# Patient Record
Sex: MALE | Race: BLACK OR AFRICAN AMERICAN | NOT HISPANIC OR LATINO | URBAN - METROPOLITAN AREA
[De-identification: names, ages, dates, MRNs, and addresses within clinical notes are randomized per-mention and may not be internally consistent; named-entity substitution may affect disease eponyms.]

---

## 2018-07-05 ENCOUNTER — INPATIENT (INPATIENT)
Facility: HOSPITAL | Age: 48
LOS: 0 days | Discharge: LEFT BEFORE DISCHARGE | DRG: 683 | End: 2018-07-06
Attending: HOSPITALIST | Admitting: HOSPITALIST
Payer: MEDICAID

## 2018-07-05 VITALS
RESPIRATION RATE: 17 BRPM | TEMPERATURE: 99 F | HEART RATE: 110 BPM | OXYGEN SATURATION: 100 % | DIASTOLIC BLOOD PRESSURE: 70 MMHG | SYSTOLIC BLOOD PRESSURE: 116 MMHG | WEIGHT: 240.08 LBS

## 2018-07-05 DIAGNOSIS — R79.89 OTHER SPECIFIED ABNORMAL FINDINGS OF BLOOD CHEMISTRY: ICD-10-CM

## 2018-07-05 DIAGNOSIS — K21.9 GASTRO-ESOPHAGEAL REFLUX DISEASE WITHOUT ESOPHAGITIS: ICD-10-CM

## 2018-07-05 DIAGNOSIS — Z91.89 OTHER SPECIFIED PERSONAL RISK FACTORS, NOT ELSEWHERE CLASSIFIED: ICD-10-CM

## 2018-07-05 DIAGNOSIS — R63.8 OTHER SYMPTOMS AND SIGNS CONCERNING FOOD AND FLUID INTAKE: ICD-10-CM

## 2018-07-05 DIAGNOSIS — B20 HUMAN IMMUNODEFICIENCY VIRUS [HIV] DISEASE: ICD-10-CM

## 2018-07-05 DIAGNOSIS — Z86.2 PERSONAL HISTORY OF DISEASES OF THE BLOOD AND BLOOD-FORMING ORGANS AND CERTAIN DISORDERS INVOLVING THE IMMUNE MECHANISM: Chronic | ICD-10-CM

## 2018-07-05 DIAGNOSIS — Z29.9 ENCOUNTER FOR PROPHYLACTIC MEASURES, UNSPECIFIED: ICD-10-CM

## 2018-07-05 DIAGNOSIS — Z90.81 ACQUIRED ABSENCE OF SPLEEN: Chronic | ICD-10-CM

## 2018-07-05 DIAGNOSIS — N17.9 ACUTE KIDNEY FAILURE, UNSPECIFIED: ICD-10-CM

## 2018-07-05 DIAGNOSIS — E87.1 HYPO-OSMOLALITY AND HYPONATREMIA: ICD-10-CM

## 2018-07-05 DIAGNOSIS — R50.9 FEVER, UNSPECIFIED: ICD-10-CM

## 2018-07-05 LAB
ALBUMIN SERPL ELPH-MCNC: 3.4 G/DL — SIGNIFICANT CHANGE UP (ref 3.3–5)
ALBUMIN SERPL ELPH-MCNC: 3.5 G/DL — SIGNIFICANT CHANGE UP (ref 3.4–5)
ALP SERPL-CCNC: 129 U/L — HIGH (ref 40–120)
ALP SERPL-CCNC: 92 U/L — SIGNIFICANT CHANGE UP (ref 40–120)
ALT FLD-CCNC: 126 U/L — HIGH (ref 12–42)
ALT FLD-CCNC: 74 U/L — HIGH (ref 10–45)
ANION GAP SERPL CALC-SCNC: 11 MMOL/L — SIGNIFICANT CHANGE UP (ref 5–17)
ANION GAP SERPL CALC-SCNC: 7 MMOL/L — LOW (ref 9–16)
APPEARANCE CSF: CLEAR — SIGNIFICANT CHANGE UP
APPEARANCE SPUN FLD: COLORLESS — SIGNIFICANT CHANGE UP
APPEARANCE UR: CLEAR — SIGNIFICANT CHANGE UP
APTT BLD: 30.5 SEC — SIGNIFICANT CHANGE UP (ref 27.5–36.5)
APTT BLD: 31.1 SEC — SIGNIFICANT CHANGE UP (ref 27.5–37.4)
AST SERPL-CCNC: 111 U/L — HIGH (ref 15–37)
AST SERPL-CCNC: 63 U/L — HIGH (ref 10–40)
BASOPHILS NFR BLD AUTO: 0.6 % — SIGNIFICANT CHANGE UP (ref 0–2)
BILIRUB SERPL-MCNC: 0.9 MG/DL — SIGNIFICANT CHANGE UP (ref 0.2–1.2)
BILIRUB SERPL-MCNC: 1 MG/DL — SIGNIFICANT CHANGE UP (ref 0.2–1.2)
BILIRUB UR-MCNC: ABNORMAL
BUN SERPL-MCNC: 15 MG/DL — SIGNIFICANT CHANGE UP (ref 7–23)
BUN SERPL-MCNC: 17 MG/DL — SIGNIFICANT CHANGE UP (ref 7–23)
CALCIUM SERPL-MCNC: 7.9 MG/DL — LOW (ref 8.4–10.5)
CALCIUM SERPL-MCNC: 8.5 MG/DL — SIGNIFICANT CHANGE UP (ref 8.5–10.5)
CHLORIDE SERPL-SCNC: 96 MMOL/L — SIGNIFICANT CHANGE UP (ref 96–108)
CHLORIDE SERPL-SCNC: 97 MMOL/L — SIGNIFICANT CHANGE UP (ref 96–108)
CHOLEST SERPL-MCNC: 148 MG/DL — SIGNIFICANT CHANGE UP (ref 10–199)
CO2 SERPL-SCNC: 23 MMOL/L — SIGNIFICANT CHANGE UP (ref 22–31)
CO2 SERPL-SCNC: 28 MMOL/L — SIGNIFICANT CHANGE UP (ref 22–31)
COLOR CSF: SIGNIFICANT CHANGE UP
COLOR SPEC: YELLOW — SIGNIFICANT CHANGE UP
CREAT SERPL-MCNC: 1.25 MG/DL — SIGNIFICANT CHANGE UP (ref 0.5–1.3)
CREAT SERPL-MCNC: 1.72 MG/DL — HIGH (ref 0.5–1.3)
CRP SERPL-MCNC: 13.14 MG/DL — HIGH (ref 0–0.4)
DIFF PNL FLD: NEGATIVE — SIGNIFICANT CHANGE UP
EOSINOPHIL NFR BLD AUTO: 0 % — SIGNIFICANT CHANGE UP (ref 0–6)
FLUAV SPEC QL CULT: NEGATIVE — SIGNIFICANT CHANGE UP
FLUBV AG SPEC QL IA: NEGATIVE — SIGNIFICANT CHANGE UP
GLUCOSE CSF-MCNC: 79 MG/DL — HIGH (ref 40–70)
GLUCOSE SERPL-MCNC: 124 MG/DL — HIGH (ref 70–99)
GLUCOSE SERPL-MCNC: 169 MG/DL — HIGH (ref 70–99)
GLUCOSE UR QL: NEGATIVE — SIGNIFICANT CHANGE UP
HAV IGM SER-ACNC: SIGNIFICANT CHANGE UP
HBA1C BLD-MCNC: 5.3 % — SIGNIFICANT CHANGE UP (ref 4–5.6)
HBV CORE IGM SER-ACNC: SIGNIFICANT CHANGE UP
HBV SURFACE AG SER-ACNC: SIGNIFICANT CHANGE UP
HCT VFR BLD CALC: 39.2 % — SIGNIFICANT CHANGE UP (ref 39–50)
HCT VFR BLD CALC: 45.6 % — SIGNIFICANT CHANGE UP (ref 39–50)
HCV AB S/CO SERPL IA: 0.14 S/CO — SIGNIFICANT CHANGE UP
HCV AB SERPL-IMP: SIGNIFICANT CHANGE UP
HDLC SERPL-MCNC: 28 MG/DL — LOW (ref 40–125)
HGB BLD-MCNC: 13.4 G/DL — SIGNIFICANT CHANGE UP (ref 13–17)
HGB BLD-MCNC: 15.7 G/DL — SIGNIFICANT CHANGE UP (ref 13–17)
IMM GRANULOCYTES NFR BLD AUTO: 0.3 % — SIGNIFICANT CHANGE UP (ref 0–1.5)
INR BLD: 1.08 — SIGNIFICANT CHANGE UP (ref 0.88–1.16)
INR BLD: 1.11 — SIGNIFICANT CHANGE UP (ref 0.88–1.16)
KETONES UR-MCNC: NEGATIVE — SIGNIFICANT CHANGE UP
LACTATE SERPL-SCNC: 1.2 MMOL/L — SIGNIFICANT CHANGE UP (ref 0.4–2)
LACTATE SERPL-SCNC: 2.1 MMOL/L — HIGH (ref 0.4–2)
LEUKOCYTE ESTERASE UR-ACNC: NEGATIVE — SIGNIFICANT CHANGE UP
LIPID PNL WITH DIRECT LDL SERPL: 89 MG/DL — SIGNIFICANT CHANGE UP
LYMPHOCYTES # BLD AUTO: 37.7 % — SIGNIFICANT CHANGE UP (ref 13–44)
MCHC RBC-ENTMCNC: 30.3 PG — SIGNIFICANT CHANGE UP (ref 27–34)
MCHC RBC-ENTMCNC: 31 PG — SIGNIFICANT CHANGE UP (ref 27–34)
MCHC RBC-ENTMCNC: 34.2 G/DL — SIGNIFICANT CHANGE UP (ref 32–36)
MCHC RBC-ENTMCNC: 34.4 G/DL — SIGNIFICANT CHANGE UP (ref 32–36)
MCV RBC AUTO: 88.7 FL — SIGNIFICANT CHANGE UP (ref 80–100)
MCV RBC AUTO: 89.9 FL — SIGNIFICANT CHANGE UP (ref 80–100)
MONOCYTES NFR BLD AUTO: 3.5 % — SIGNIFICANT CHANGE UP (ref 2–14)
NEUTROPHILS # CSF: 0 % — SIGNIFICANT CHANGE UP (ref 0–6)
NEUTROPHILS NFR BLD AUTO: 57.9 % — SIGNIFICANT CHANGE UP (ref 43–77)
NITRITE UR-MCNC: NEGATIVE — SIGNIFICANT CHANGE UP
NRBC NFR CSF: 1 /UL — SIGNIFICANT CHANGE UP
PH UR: 6 — SIGNIFICANT CHANGE UP (ref 5–8)
PLATELET # BLD AUTO: 213 K/UL — SIGNIFICANT CHANGE UP (ref 150–400)
PLATELET # BLD AUTO: 266 K/UL — SIGNIFICANT CHANGE UP (ref 150–400)
POTASSIUM SERPL-MCNC: 3 MMOL/L — LOW (ref 3.5–5.3)
POTASSIUM SERPL-MCNC: 3.6 MMOL/L — SIGNIFICANT CHANGE UP (ref 3.5–5.3)
POTASSIUM SERPL-SCNC: 3 MMOL/L — LOW (ref 3.5–5.3)
POTASSIUM SERPL-SCNC: 3.6 MMOL/L — SIGNIFICANT CHANGE UP (ref 3.5–5.3)
PROT CSF-MCNC: 48 MG/DL — HIGH (ref 15–45)
PROT SERPL-MCNC: 5.8 G/DL — LOW (ref 6–8.3)
PROT SERPL-MCNC: 8 G/DL — SIGNIFICANT CHANGE UP (ref 6.4–8.2)
PROT UR-MCNC: 100 MG/DL
PROTHROM AB SERPL-ACNC: 12 SEC — SIGNIFICANT CHANGE UP (ref 9.8–12.7)
PROTHROM AB SERPL-ACNC: 12.2 SEC — SIGNIFICANT CHANGE UP (ref 9.8–12.7)
RBC # BLD: 4.42 M/UL — SIGNIFICANT CHANGE UP (ref 4.2–5.8)
RBC # BLD: 5.07 M/UL — SIGNIFICANT CHANGE UP (ref 4.2–5.8)
RBC # CSF: 0 /UL — SIGNIFICANT CHANGE UP (ref 0–0)
RBC # FLD: 14.5 % — SIGNIFICANT CHANGE UP (ref 10.3–16.9)
RBC # FLD: 14.9 % — SIGNIFICANT CHANGE UP (ref 10.3–16.9)
SODIUM SERPL-SCNC: 131 MMOL/L — LOW (ref 132–145)
SODIUM SERPL-SCNC: 131 MMOL/L — LOW (ref 135–145)
SP GR SPEC: >=1.03 — SIGNIFICANT CHANGE UP (ref 1–1.03)
TOTAL CHOLESTEROL/HDL RATIO MEASUREMENT: 5.3 RATIO — SIGNIFICANT CHANGE UP (ref 3.4–9.6)
TRIGL SERPL-MCNC: 153 MG/DL — HIGH (ref 10–149)
TUBE TYPE: SIGNIFICANT CHANGE UP
UROBILINOGEN FLD QL: 1 E.U./DL — SIGNIFICANT CHANGE UP
WBC # BLD: 3.6 K/UL — LOW (ref 3.8–10.5)
WBC # BLD: 6.3 K/UL — SIGNIFICANT CHANGE UP (ref 3.8–10.5)
WBC # FLD AUTO: 3.6 K/UL — LOW (ref 3.8–10.5)
WBC # FLD AUTO: 6.3 K/UL — SIGNIFICANT CHANGE UP (ref 3.8–10.5)

## 2018-07-05 PROCEDURE — 62270 DX LMBR SPI PNXR: CPT

## 2018-07-05 PROCEDURE — 99223 1ST HOSP IP/OBS HIGH 75: CPT | Mod: GC

## 2018-07-05 PROCEDURE — 76705 ECHO EXAM OF ABDOMEN: CPT | Mod: 26

## 2018-07-05 PROCEDURE — 70450 CT HEAD/BRAIN W/O DYE: CPT | Mod: 26

## 2018-07-05 PROCEDURE — 71046 X-RAY EXAM CHEST 2 VIEWS: CPT | Mod: 26

## 2018-07-05 PROCEDURE — 99285 EMERGENCY DEPT VISIT HI MDM: CPT | Mod: 25

## 2018-07-05 RX ORDER — CEFTRIAXONE 500 MG/1
1 INJECTION, POWDER, FOR SOLUTION INTRAMUSCULAR; INTRAVENOUS ONCE
Qty: 0 | Refills: 0 | Status: COMPLETED | OUTPATIENT
Start: 2018-07-05 | End: 2018-07-05

## 2018-07-05 RX ORDER — POTASSIUM CHLORIDE 20 MEQ
40 PACKET (EA) ORAL ONCE
Qty: 0 | Refills: 0 | Status: COMPLETED | OUTPATIENT
Start: 2018-07-05 | End: 2018-07-05

## 2018-07-05 RX ORDER — ELVITEGRAVIR, COBICISTAT, EMTRICITABINE, AND TENOFOVIR ALAFENAMIDE 150; 150; 200; 10 MG/1; MG/1; MG/1; MG/1
1 TABLET ORAL
Qty: 0 | Refills: 0 | COMMUNITY

## 2018-07-05 RX ORDER — HEPARIN SODIUM 5000 [USP'U]/ML
5000 INJECTION INTRAVENOUS; SUBCUTANEOUS EVERY 8 HOURS
Qty: 0 | Refills: 0 | Status: DISCONTINUED | OUTPATIENT
Start: 2018-07-05 | End: 2018-07-06

## 2018-07-05 RX ORDER — POTASSIUM CHLORIDE 20 MEQ
40 PACKET (EA) ORAL ONCE
Qty: 0 | Refills: 0 | Status: DISCONTINUED | OUTPATIENT
Start: 2018-07-05 | End: 2018-07-06

## 2018-07-05 RX ORDER — SODIUM CHLORIDE 9 MG/ML
3 INJECTION INTRAMUSCULAR; INTRAVENOUS; SUBCUTANEOUS ONCE
Qty: 0 | Refills: 0 | Status: COMPLETED | OUTPATIENT
Start: 2018-07-05 | End: 2018-07-05

## 2018-07-05 RX ORDER — ACETAMINOPHEN 500 MG
975 TABLET ORAL ONCE
Qty: 0 | Refills: 0 | Status: COMPLETED | OUTPATIENT
Start: 2018-07-05 | End: 2018-07-05

## 2018-07-05 RX ORDER — VANCOMYCIN HCL 1 G
1000 VIAL (EA) INTRAVENOUS ONCE
Qty: 0 | Refills: 0 | Status: DISCONTINUED | OUTPATIENT
Start: 2018-07-05 | End: 2018-07-05

## 2018-07-05 RX ORDER — ELVITEGRAVIR, COBICISTAT, EMTRICITABINE, AND TENOFOVIR ALAFENAMIDE 150; 150; 200; 10 MG/1; MG/1; MG/1; MG/1
1 TABLET ORAL DAILY
Qty: 0 | Refills: 0 | Status: DISCONTINUED | OUTPATIENT
Start: 2018-07-05 | End: 2018-07-06

## 2018-07-05 RX ORDER — SODIUM CHLORIDE 9 MG/ML
500 INJECTION INTRAMUSCULAR; INTRAVENOUS; SUBCUTANEOUS
Qty: 0 | Refills: 0 | Status: COMPLETED | OUTPATIENT
Start: 2018-07-05 | End: 2018-07-05

## 2018-07-05 RX ORDER — VANCOMYCIN HCL 1 G
1500 VIAL (EA) INTRAVENOUS ONCE
Qty: 0 | Refills: 0 | Status: COMPLETED | OUTPATIENT
Start: 2018-07-05 | End: 2018-07-05

## 2018-07-05 RX ORDER — OMEPRAZOLE 10 MG/1
1 CAPSULE, DELAYED RELEASE ORAL
Qty: 0 | Refills: 0 | COMMUNITY

## 2018-07-05 RX ORDER — SODIUM CHLORIDE 9 MG/ML
1000 INJECTION INTRAMUSCULAR; INTRAVENOUS; SUBCUTANEOUS
Qty: 0 | Refills: 0 | Status: DISCONTINUED | OUTPATIENT
Start: 2018-07-05 | End: 2018-07-06

## 2018-07-05 RX ORDER — PANTOPRAZOLE SODIUM 20 MG/1
40 TABLET, DELAYED RELEASE ORAL
Qty: 0 | Refills: 0 | Status: DISCONTINUED | OUTPATIENT
Start: 2018-07-05 | End: 2018-07-06

## 2018-07-05 RX ORDER — ACETAMINOPHEN 500 MG
1000 TABLET ORAL ONCE
Qty: 0 | Refills: 0 | Status: COMPLETED | OUTPATIENT
Start: 2018-07-05 | End: 2018-07-05

## 2018-07-05 RX ADMIN — Medication 975 MILLIGRAM(S): at 12:29

## 2018-07-05 RX ADMIN — SODIUM CHLORIDE 2000 MILLILITER(S): 9 INJECTION INTRAMUSCULAR; INTRAVENOUS; SUBCUTANEOUS at 12:26

## 2018-07-05 RX ADMIN — SODIUM CHLORIDE 3 MILLILITER(S): 9 INJECTION INTRAMUSCULAR; INTRAVENOUS; SUBCUTANEOUS at 12:32

## 2018-07-05 RX ADMIN — SODIUM CHLORIDE 500 MILLILITER(S): 9 INJECTION INTRAMUSCULAR; INTRAVENOUS; SUBCUTANEOUS at 14:10

## 2018-07-05 RX ADMIN — SODIUM CHLORIDE 2000 MILLILITER(S): 9 INJECTION INTRAMUSCULAR; INTRAVENOUS; SUBCUTANEOUS at 12:33

## 2018-07-05 RX ADMIN — CEFTRIAXONE 100 GRAM(S): 500 INJECTION, POWDER, FOR SOLUTION INTRAMUSCULAR; INTRAVENOUS at 12:48

## 2018-07-05 RX ADMIN — SODIUM CHLORIDE 500 MILLILITER(S): 9 INJECTION INTRAMUSCULAR; INTRAVENOUS; SUBCUTANEOUS at 13:09

## 2018-07-05 RX ADMIN — SODIUM CHLORIDE 500 MILLILITER(S): 9 INJECTION INTRAMUSCULAR; INTRAVENOUS; SUBCUTANEOUS at 13:15

## 2018-07-05 RX ADMIN — SODIUM CHLORIDE 2000 MILLILITER(S): 9 INJECTION INTRAMUSCULAR; INTRAVENOUS; SUBCUTANEOUS at 13:14

## 2018-07-05 RX ADMIN — Medication 300 MILLIGRAM(S): at 17:08

## 2018-07-05 RX ADMIN — SODIUM CHLORIDE 150 MILLILITER(S): 9 INJECTION INTRAMUSCULAR; INTRAVENOUS; SUBCUTANEOUS at 23:11

## 2018-07-05 RX ADMIN — Medication 400 MILLIGRAM(S): at 21:02

## 2018-07-05 RX ADMIN — SODIUM CHLORIDE 2000 MILLILITER(S): 9 INJECTION INTRAMUSCULAR; INTRAVENOUS; SUBCUTANEOUS at 12:55

## 2018-07-05 RX ADMIN — SODIUM CHLORIDE 2000 MILLILITER(S): 9 INJECTION INTRAMUSCULAR; INTRAVENOUS; SUBCUTANEOUS at 12:34

## 2018-07-05 RX ADMIN — CEFTRIAXONE 1 GRAM(S): 500 INJECTION, POWDER, FOR SOLUTION INTRAMUSCULAR; INTRAVENOUS at 13:30

## 2018-07-05 RX ADMIN — SODIUM CHLORIDE 2000 MILLILITER(S): 9 INJECTION INTRAMUSCULAR; INTRAVENOUS; SUBCUTANEOUS at 12:32

## 2018-07-05 RX ADMIN — HEPARIN SODIUM 5000 UNIT(S): 5000 INJECTION INTRAVENOUS; SUBCUTANEOUS at 23:10

## 2018-07-05 NOTE — H&P ADULT - NSHPREVIEWOFSYSTEMS_GEN_ALL_CORE
REVIEW OF SYSTEMS:    CONSTITUTIONAL: As in HPI  EYES/ENT: Patient denies visual changes;  denies vertigo or throat pain   NECK: Patient denies pain or stiffness  RESPIRATORY: Patient denies cough, wheezing, hemoptysis; denies shortness of breath  CARDIOVASCULAR: Patient denies chest pain or palpitations  GASTROINTESTINAL: Patient denies abdominal or epigastric pain, nausea, vomiting, or hematemesis, diarrhea or constipation, melena or hematochezia.  GENITOURINARY: Patient denies dysuria, frequency or hematuria  NEUROLOGICAL: Patient denies numbness or weakness  SKIN: Patient denies itching, burning, rashes, or lesions   All other review of systems is negative unless indicated above.

## 2018-07-05 NOTE — H&P ADULT - PROBLEM SELECTOR PLAN 3
Patient with ROBBIN without elevated BUN, dark urine despite patient reporting high level of hydration during febrile illness. Unclear etiology as patient with dark urine but with elevated lft may have had hypotensive injury, so both pre-renal and intrinsic damage is possible  - follow up repeat CMP  - Follow up urine lytes  - follow up renal ulrasound  - Patient voiding well, no concern for obstruction on physical exam or RUQ U/s however will check post void bladder scan  - Consider nephrology consult in AM

## 2018-07-05 NOTE — H&P ADULT - PROBLEM SELECTOR PLAN 2
Patient reports HIV well controlled  - Collateral in AM  - HIV VL  - No need for CD4 count as well be decreased due to acute illness, collateral is more important. No History of OI. Patient does not know how contracted, reports safe sexual practices and no known contacts, no IVDU. 15 years ago, untreated for about 8 years, then started on medication. Does not remember tanika CD4 or peak VL, does not think he was ever given diagnosis of AIDs.

## 2018-07-05 NOTE — H&P ADULT - ATTENDING COMMENTS
Pt seen and examined at bedside on 7/5/2018 @ 2100    Agree with HPI, ROS as above.     VS, Labs, FH, SH, allergies, medications, imaging reviewed. I personally reviewed the CXR - no infiltrates. Agree with physical exam as above     A/P: Patient is a 47 yo M with PMHx of HIV (VLUD, CD4 unknown level but "Good", on Genvoya), ITP 15 years ago s/p splenectomy presents with two days of fevers, chills, HA and diaphoresis, admitted for workup of SIRS given negative LP, ROBBIN, and elevated LFTs    **SIRS  -Pt meeting 2/4 SIRS criteria on admission with high fevers, tachycardia with unclear etiology  -No clear infectious source; LP not c/w bacterial meningitis, RVP negative, U/A negative, CXR with no infiltrates, U/S abd - wnl  -F/u cultures  -Will explore other causes - check ferritin, hepatitis panel, esr/crp, possible rheumatologic?, Stills?    Plan otherwise as outlined above.....

## 2018-07-05 NOTE — ED ADULT NURSE REASSESSMENT NOTE - NS ED NURSE REASSESS COMMENT FT1
Pt lyign flat in stretcher in no acute distress. VSS, see flowsheet. Safety precautions maintained, will continue to monitor.

## 2018-07-05 NOTE — H&P ADULT - HISTORY OF PRESENT ILLNESS
Patient is a 49 yo M with PMHx of HIV (VLUD, CD4 unknown level but "Good", on Genvoya), ITP 15 years ago s/p splenectomy. Sent by PMD to urgent care then to ED for 2 days of fever, chills, and HA and decreased appetite. Patient was in usual state of health 3 days ago, was out working a job (, works outdoors) in the heat, went home and then started to have fevers overnight into the next day, with chills, diaphoresis, HA, and lack of appetite. Patient states he drank a lot of fluid during this time but had no appetite for food, denies N/V/D/C. HA worsened and did not remit with OTC Nsaids or tylenol, and so presented to PMD who sent him to urgent care who then sent him to [facility]. Patient last traveled 1 month ago to [place], has history of frequent travels but has no hx of travel related illness or rural/tropical exposures. Lives with one dog, eduardo, has all of his shots (friend is taking care). No history of AIDs, reports last hospitalization was for splenectomy, no antibiotics reported in many years.    ED Vitals: T 101.8, HR 96, /70, RR 16, SpO2 92%  ED administration: Ceftriaxone 1gm, Vancomycin 1500mg,  cc x6, tylenol 975 Patient is a 47 yo M with PMHx of HIV (VLUD, CD4 unknown level but "Good", on Genvoya), ITP 15 years ago s/p splenectomy. Sent by PMD to urgent care then to ED for 2 days of fever, chills, and HA and decreased appetite. Patient was in usual state of health 3 days ago, was out working a job (, works outdoors) in the heat, went home and then started to have fevers overnight into the next day, with chills, diaphoresis, HA, and lack of appetite. Patient states he drank a lot of fluid during this time but had no appetite for food, denies N/V/D/C. HA worsened and did not remit with OTC Nsaids or tylenol, and so presented to PMD who sent him to urgent care who then sent him to [facility]. Patient last traveled 1 month ago to [place], has history of frequent travels but has no hx of travel related illness or rural/tropical exposures. Lives with one dog, eduardo, has all of his shots (friend is taking care). No history of AIDs, reports last hospitalization was for splenectomy, no antibiotics reported in many years.    ED Vitals: T 101.8, HR 96, /70, RR 16, SpO2 92%  ED administration: Ceftriaxone 1gm, Vancomycin 1500mg,  cc x6, tylenol 975    FH: Denies cardiac disease in father

## 2018-07-05 NOTE — H&P ADULT - NSHPPHYSICALEXAM_GEN_ALL_CORE
.  VITAL SIGNS:  T(F): 99.5 (07-05-18 @ 18:35), Max: 101.8 (07-05-18 @ 12:25)  HR: 94 (07-05-18 @ 18:35) (90 - 110)  BP: 94/64 (07-05-18 @ 18:35) (94/64 - 135/70)  BP(mean): --  RR: 19 (07-05-18 @ 18:35) (16 - 19)  SpO2: 98% (07-05-18 @ 18:35) (92% - 100%)    PHYSICAL EXAM:    Constitutional: WDWN resting comfortably in bed; NAD  HEENT: NC/AT, PERRL, EOMI, anicteric sclera, no nasal discharge; uvula midline, no oropharyngeal erythema or exudates; MMM; no thrush; Red in the face due to sun exposure per patient, not painful, warm to the touch  Neck: supple; no JVD or thyromegaly  Respiratory: CTA B/L; no W/R/R, no retractions  Cardiac: +S1/S2; RRR; no M/R/G; PMI non-displaced  Gastrointestinal: soft, NT/ND; no rebound or guarding; +BSx4; Obese  Genitourinary: normal external genitalia  Back: spine midline, no bony tenderness or step-offs; no CVAT B/L  Extremities: WWP, no clubbing or cyanosis; no peripheral edema  Musculoskeletal: NROM x4; no joint swelling, tenderness or erythema  Vascular: 2+ radial, femoral, DP/PT pulses B/L  Dermatologic: skin warm, dry and intact; no rashes, wounds, or scars  Lymphatic: no submandibular or cervical LAD  Neurologic: AAOx3; CNII-XII grossly intact; no focal deficits  Psychiatric: affect and characteristics of appearance, verbalizations, behaviors are appropriate, denies SI/HI/AH/VH

## 2018-07-05 NOTE — H&P ADULT - PROBLEM SELECTOR PLAN 1
Patient with high grade fever, 103F, without clear source possibly causing end organ damage as detailed below.  - Judicious use of acetaminophen and nsaids for fever control given end organ damage as below  - Continue workup for source control  - Consider broad spectrum antibiotics when source has been identified Patient with high grade fever, 103F, without clear source possibly causing end organ damage as detailed below.  - Judicious use of acetaminophen and nsaids for fever control given end organ damage as below  - Continue workup for source control  - f/u ferritin for stills  - f/u ESR, CRP  - Consider broad spectrum antibiotics when source has been identified

## 2018-07-05 NOTE — H&P ADULT - ASSESSMENT
Patient is a 49 yo M with PMHx of HIV (VLUD, CD4 unknown level but "Good", on Genvoya), ITP 15 years ago s/p splenectomy presents with two days of fevers, chills, HA and diaphoresis, admitted for workup of SIRS given negative LP, ROBBIN, and elevated LFTs

## 2018-07-05 NOTE — H&P ADULT - PROBLEM SELECTOR PLAN 4
RUQ ultrasound normal, likely due to injury from fever as above  - Acute hepatitis panel  - Repeat LFTs, Coags

## 2018-07-05 NOTE — ED PROVIDER NOTE - OBJECTIVE STATEMENT
48y M HIV+ (on medication, undetectable VL, good CD4 count), daily EtOH ingestion, sent in by PMD for 2 days of fever, chills, and headache, as well as decreased appetite. Pt was in [place] 1 month ago. Denies cough, sore throat, abd pain, n/v, urinary symptoms.

## 2018-07-05 NOTE — H&P ADULT - NSHPLABSRESULTS_GEN_ALL_CORE
.  LABS:                         15.7   6.3   )-----------( 266      ( 05 Jul 2018 12:32 )             45.6     07-05    131<L>  |  96  |  17  ----------------------------<  124<H>  3.6   |  28  |  1.72<H>    Ca    8.5      05 Jul 2018 12:32    TPro  8.0  /  Alb  3.5  /  TBili  0.9  /  DBili  x   /  AST  111<H>  /  ALT  126<H>  /  AlkPhos  129<H>  07-05    PT/INR - ( 05 Jul 2018 12:32 )   PT: 12.2 sec;   INR: 1.11          PTT - ( 05 Jul 2018 12:32 )  PTT:30.5 sec  Urinalysis Basic - ( 05 Jul 2018 12:32 )    Color: Yellow / Appearance: Clear / SG: >=1.030 / pH: x  Gluc: x / Ketone: NEGATIVE  / Bili: Moderate / Urobili: 1.0 E.U./dL   Blood: x / Protein: 100 mg/dL / Nitrite: NEGATIVE   Leuk Esterase: NEGATIVE / RBC: x / WBC x   Sq Epi: x / Non Sq Epi: Rare /HPF / Bacteria: x            Lactate, Blood: 1.2 mmoL/L (07-05 @ 15:42)  Lactate, Blood: 2.1 mmoL/L (07-05 @ 12:32)      RADIOLOGY, EKG & ADDITIONAL TESTS:   < from: CT Head No Cont (07.05.18 @ 13:22) >    Small fatty and calcific lesion in the posterior suprasellar cistern is   most compatible with congenital lesion such as dermoid cyst and may be   incidental, as there is no evidence of cyst rupture. The study is   otherwise unremarkable.      < end of copied text >    Cerebrospinal Fluid Cell Count-1 (07.05.18 @ 14:57)    Total Nucleated Cell Count, CSF: 1 /uL    CSF Color: No Color    CSF Appearance: Clear    CSF Segmented Neutrophils: 0 %  Glucose, CSF (07.05.18 @ 14:56)    Glucose, CSF: 79 mg/dL  Protein, CSF (07.05.18 @ 14:56)    Protein, CSF: 48 mg/dL

## 2018-07-05 NOTE — H&P ADULT - NSHPSOCIALHISTORY_GEN_ALL_CORE
Occupation:   Lives with: Alone, dog  Alcohol: (X ) none  ( ) occasional ( ) 2-3 times a week ( ) daily; Last drink:   History of DTs:   Tobacco usage: ( X) never smoked   ( ) former smoker  ( ) current smoker; Packs per day:   Drug Use: ( X)Never ( ) IVDU ( ) Illicit drug use:   Marital Status: Not  Sexual History: MSM

## 2018-07-05 NOTE — H&P ADULT - PROBLEM SELECTOR PLAN 5
Likely due to dehydration given histry of fever and physical exam findings.   - Follow up serum osm  - Follow up repeat CMP  - Follow up urine lytes

## 2018-07-05 NOTE — ED PROVIDER NOTE - MEDICAL DECISION MAKING DETAILS
Pt with HIV (controlled), presents with fever of unclear source. Will send labs, LP, get imaging, and dispo accordingly.

## 2018-07-06 VITALS
TEMPERATURE: 99 F | HEART RATE: 92 BPM | OXYGEN SATURATION: 94 % | DIASTOLIC BLOOD PRESSURE: 72 MMHG | SYSTOLIC BLOOD PRESSURE: 119 MMHG | RESPIRATION RATE: 20 BRPM

## 2018-07-06 DIAGNOSIS — R50.9 FEVER, UNSPECIFIED: ICD-10-CM

## 2018-07-06 LAB
ALBUMIN SERPL ELPH-MCNC: 3.2 G/DL — LOW (ref 3.3–5)
ALP SERPL-CCNC: 106 U/L — SIGNIFICANT CHANGE UP (ref 40–120)
ALT FLD-CCNC: 72 U/L — HIGH (ref 10–45)
ANION GAP SERPL CALC-SCNC: 15 MMOL/L — SIGNIFICANT CHANGE UP (ref 5–17)
APPEARANCE UR: CLEAR — SIGNIFICANT CHANGE UP
AST SERPL-CCNC: 59 U/L — HIGH (ref 10–40)
BASOPHILS NFR BLD AUTO: 1 % — SIGNIFICANT CHANGE UP (ref 0–2)
BILIRUB SERPL-MCNC: 0.9 MG/DL — SIGNIFICANT CHANGE UP (ref 0.2–1.2)
BILIRUB UR-MCNC: ABNORMAL
BUN SERPL-MCNC: 12 MG/DL — SIGNIFICANT CHANGE UP (ref 7–23)
CALCIUM SERPL-MCNC: 7.7 MG/DL — LOW (ref 8.4–10.5)
CHLORIDE SERPL-SCNC: 97 MMOL/L — SIGNIFICANT CHANGE UP (ref 96–108)
CK SERPL-CCNC: 129 U/L — SIGNIFICANT CHANGE UP (ref 30–200)
CO2 SERPL-SCNC: 22 MMOL/L — SIGNIFICANT CHANGE UP (ref 22–31)
COLOR SPEC: YELLOW — SIGNIFICANT CHANGE UP
CREAT ?TM UR-MCNC: 208 MG/DL — SIGNIFICANT CHANGE UP
CREAT SERPL-MCNC: 1.12 MG/DL — SIGNIFICANT CHANGE UP (ref 0.5–1.3)
CRYPTOC AG CSF-ACNC: NEGATIVE — SIGNIFICANT CHANGE UP
CSF PCR RESULT: SIGNIFICANT CHANGE UP
CULTURE RESULTS: NO GROWTH — SIGNIFICANT CHANGE UP
DIFF PNL FLD: ABNORMAL
EOSINOPHIL NFR BLD AUTO: 0.2 % — SIGNIFICANT CHANGE UP (ref 0–6)
ERYTHROCYTE [SEDIMENTATION RATE] IN BLOOD: 3 MM/HR — SIGNIFICANT CHANGE UP
FERRITIN SERPL-MCNC: 1520 NG/ML — HIGH (ref 30–400)
GLUCOSE SERPL-MCNC: 114 MG/DL — HIGH (ref 70–99)
GLUCOSE UR QL: NEGATIVE — SIGNIFICANT CHANGE UP
GRAM STN FLD: SIGNIFICANT CHANGE UP
HBV SURFACE AB SER-ACNC: SIGNIFICANT CHANGE UP
HCT VFR BLD CALC: 40.2 % — SIGNIFICANT CHANGE UP (ref 39–50)
HGB BLD-MCNC: 13.5 G/DL — SIGNIFICANT CHANGE UP (ref 13–17)
HIV-1 VIRAL LOAD RESULT: ABNORMAL
HIV1 RNA # SERPL NAA+PROBE: SIGNIFICANT CHANGE UP
HIV1 RNA SER-IMP: SIGNIFICANT CHANGE UP
HIV1 RNA SERPL NAA+PROBE-ACNC: ABNORMAL
HIV1 RNA SERPL NAA+PROBE-LOG#: ABNORMAL LG COP/ML
IRON SATN MFR SERPL: 22 UG/DL — LOW (ref 45–165)
IRON SATN MFR SERPL: 9 % — LOW (ref 16–55)
KETONES UR-MCNC: 40 MG/DL
LDH SERPL L TO P-CCNC: 231 U/L — SIGNIFICANT CHANGE UP (ref 50–242)
LEUKOCYTE ESTERASE UR-ACNC: NEGATIVE — SIGNIFICANT CHANGE UP
LYMPHOCYTES # BLD AUTO: 38.8 % — SIGNIFICANT CHANGE UP (ref 13–44)
MAGNESIUM SERPL-MCNC: 1.9 MG/DL — SIGNIFICANT CHANGE UP (ref 1.6–2.6)
MCHC RBC-ENTMCNC: 30.4 PG — SIGNIFICANT CHANGE UP (ref 27–34)
MCHC RBC-ENTMCNC: 33.6 G/DL — SIGNIFICANT CHANGE UP (ref 32–36)
MCV RBC AUTO: 90.5 FL — SIGNIFICANT CHANGE UP (ref 80–100)
MONOCYTES NFR BLD AUTO: 6.4 % — SIGNIFICANT CHANGE UP (ref 2–14)
NEUTROPHILS NFR BLD AUTO: 53.6 % — SIGNIFICANT CHANGE UP (ref 43–77)
NITRITE UR-MCNC: NEGATIVE — SIGNIFICANT CHANGE UP
PH UR: 6 — SIGNIFICANT CHANGE UP (ref 5–8)
PHOSPHATE SERPL-MCNC: 1.4 MG/DL — LOW (ref 2.5–4.5)
PLATELET # BLD AUTO: 204 K/UL — SIGNIFICANT CHANGE UP (ref 150–400)
POTASSIUM SERPL-MCNC: 3.7 MMOL/L — SIGNIFICANT CHANGE UP (ref 3.5–5.3)
POTASSIUM SERPL-SCNC: 3.7 MMOL/L — SIGNIFICANT CHANGE UP (ref 3.5–5.3)
PROT SERPL-MCNC: 6.3 G/DL — SIGNIFICANT CHANGE UP (ref 6–8.3)
PROT UR-MCNC: 100 MG/DL
RAPID RVP RESULT: SIGNIFICANT CHANGE UP
RBC # BLD: 4.44 M/UL — SIGNIFICANT CHANGE UP (ref 4.2–5.8)
RBC # FLD: 14.6 % — SIGNIFICANT CHANGE UP (ref 10.3–16.9)
SODIUM SERPL-SCNC: 134 MMOL/L — LOW (ref 135–145)
SODIUM UR-SCNC: 123 MMOL/L — SIGNIFICANT CHANGE UP
SP GR SPEC: >=1.03 — SIGNIFICANT CHANGE UP (ref 1–1.03)
SPECIMEN SOURCE: SIGNIFICANT CHANGE UP
SPECIMEN SOURCE: SIGNIFICANT CHANGE UP
TIBC SERPL-MCNC: 238 UG/DL — SIGNIFICANT CHANGE UP (ref 220–430)
TSH SERPL-MCNC: 1.54 UIU/ML — SIGNIFICANT CHANGE UP (ref 0.35–4.94)
UIBC SERPL-MCNC: 216 UG/DL — SIGNIFICANT CHANGE UP (ref 110–370)
UROBILINOGEN FLD QL: 2 E.U./DL
WBC # BLD: 6.1 K/UL — SIGNIFICANT CHANGE UP (ref 3.8–10.5)
WBC # FLD AUTO: 6.1 K/UL — SIGNIFICANT CHANGE UP (ref 3.8–10.5)

## 2018-07-06 PROCEDURE — 87483 CNS DNA AMP PROBE TYPE 12-25: CPT

## 2018-07-06 PROCEDURE — 71046 X-RAY EXAM CHEST 2 VIEWS: CPT

## 2018-07-06 PROCEDURE — 85652 RBC SED RATE AUTOMATED: CPT

## 2018-07-06 PROCEDURE — 87070 CULTURE OTHR SPECIMN AEROBIC: CPT

## 2018-07-06 PROCEDURE — 96365 THER/PROPH/DIAG IV INF INIT: CPT | Mod: 59

## 2018-07-06 PROCEDURE — 86140 C-REACTIVE PROTEIN: CPT

## 2018-07-06 PROCEDURE — 99221 1ST HOSP IP/OBS SF/LOW 40: CPT

## 2018-07-06 PROCEDURE — 87086 URINE CULTURE/COLONY COUNT: CPT

## 2018-07-06 PROCEDURE — 82945 GLUCOSE OTHER FLUID: CPT

## 2018-07-06 PROCEDURE — 84157 ASSAY OF PROTEIN OTHER: CPT

## 2018-07-06 PROCEDURE — 85610 PROTHROMBIN TIME: CPT

## 2018-07-06 PROCEDURE — 89051 BODY FLUID CELL COUNT: CPT

## 2018-07-06 PROCEDURE — 82550 ASSAY OF CK (CPK): CPT

## 2018-07-06 PROCEDURE — 99285 EMERGENCY DEPT VISIT HI MDM: CPT | Mod: 25

## 2018-07-06 PROCEDURE — 83550 IRON BINDING TEST: CPT

## 2018-07-06 PROCEDURE — 96361 HYDRATE IV INFUSION ADD-ON: CPT | Mod: 59

## 2018-07-06 PROCEDURE — 86403 PARTICLE AGGLUT ANTBDY SCRN: CPT

## 2018-07-06 PROCEDURE — 87662 ZIKA VIRUS DNA/RNA AMP PROBE: CPT

## 2018-07-06 PROCEDURE — 85027 COMPLETE CBC AUTOMATED: CPT

## 2018-07-06 PROCEDURE — 87581 M.PNEUMON DNA AMP PROBE: CPT

## 2018-07-06 PROCEDURE — 87536 HIV-1 QUANT&REVRSE TRNSCRPJ: CPT

## 2018-07-06 PROCEDURE — 96375 TX/PRO/DX INJ NEW DRUG ADDON: CPT | Mod: 59

## 2018-07-06 PROCEDURE — 86225 DNA ANTIBODY NATIVE: CPT

## 2018-07-06 PROCEDURE — 86038 ANTINUCLEAR ANTIBODIES: CPT

## 2018-07-06 PROCEDURE — 86757 RICKETTSIA ANTIBODY: CPT

## 2018-07-06 PROCEDURE — 85730 THROMBOPLASTIN TIME PARTIAL: CPT

## 2018-07-06 PROCEDURE — 86706 HEP B SURFACE ANTIBODY: CPT

## 2018-07-06 PROCEDURE — 87486 CHLMYD PNEUM DNA AMP PROBE: CPT

## 2018-07-06 PROCEDURE — 36415 COLL VENOUS BLD VENIPUNCTURE: CPT

## 2018-07-06 PROCEDURE — 93005 ELECTROCARDIOGRAM TRACING: CPT

## 2018-07-06 PROCEDURE — 80061 LIPID PANEL: CPT

## 2018-07-06 PROCEDURE — 86794 ZIKA VIRUS IGM ANTIBODY: CPT

## 2018-07-06 PROCEDURE — 87400 INFLUENZA A/B EACH AG IA: CPT

## 2018-07-06 PROCEDURE — 62270 DX LMBR SPI PNXR: CPT

## 2018-07-06 PROCEDURE — 83036 HEMOGLOBIN GLYCOSYLATED A1C: CPT

## 2018-07-06 PROCEDURE — 86618 LYME DISEASE ANTIBODY: CPT

## 2018-07-06 PROCEDURE — 84300 ASSAY OF URINE SODIUM: CPT

## 2018-07-06 PROCEDURE — 84100 ASSAY OF PHOSPHORUS: CPT

## 2018-07-06 PROCEDURE — 76705 ECHO EXAM OF ABDOMEN: CPT

## 2018-07-06 PROCEDURE — 82570 ASSAY OF URINE CREATININE: CPT

## 2018-07-06 PROCEDURE — 84443 ASSAY THYROID STIM HORMONE: CPT

## 2018-07-06 PROCEDURE — 87207 SMEAR SPECIAL STAIN: CPT

## 2018-07-06 PROCEDURE — 70450 CT HEAD/BRAIN W/O DYE: CPT

## 2018-07-06 PROCEDURE — 86790 VIRUS ANTIBODY NOS: CPT

## 2018-07-06 PROCEDURE — 85025 COMPLETE CBC W/AUTO DIFF WBC: CPT

## 2018-07-06 PROCEDURE — 99239 HOSP IP/OBS DSCHRG MGMT >30: CPT

## 2018-07-06 PROCEDURE — 87633 RESP VIRUS 12-25 TARGETS: CPT

## 2018-07-06 PROCEDURE — 82728 ASSAY OF FERRITIN: CPT

## 2018-07-06 PROCEDURE — 86780 TREPONEMA PALLIDUM: CPT

## 2018-07-06 PROCEDURE — 86431 RHEUMATOID FACTOR QUANT: CPT

## 2018-07-06 PROCEDURE — 83615 LACTATE (LD) (LDH) ENZYME: CPT

## 2018-07-06 PROCEDURE — 86200 CCP ANTIBODY: CPT

## 2018-07-06 PROCEDURE — 82962 GLUCOSE BLOOD TEST: CPT

## 2018-07-06 PROCEDURE — 83605 ASSAY OF LACTIC ACID: CPT

## 2018-07-06 PROCEDURE — 86645 CMV ANTIBODY IGM: CPT

## 2018-07-06 PROCEDURE — 87205 SMEAR GRAM STAIN: CPT

## 2018-07-06 PROCEDURE — 93010 ELECTROCARDIOGRAM REPORT: CPT

## 2018-07-06 PROCEDURE — 87798 DETECT AGENT NOS DNA AMP: CPT

## 2018-07-06 PROCEDURE — 87040 BLOOD CULTURE FOR BACTERIA: CPT

## 2018-07-06 PROCEDURE — 80053 COMPREHEN METABOLIC PANEL: CPT

## 2018-07-06 PROCEDURE — 80074 ACUTE HEPATITIS PANEL: CPT

## 2018-07-06 PROCEDURE — 81001 URINALYSIS AUTO W/SCOPE: CPT

## 2018-07-06 PROCEDURE — 83735 ASSAY OF MAGNESIUM: CPT

## 2018-07-06 RX ORDER — ACETAMINOPHEN 500 MG
650 TABLET ORAL ONCE
Qty: 0 | Refills: 0 | Status: COMPLETED | OUTPATIENT
Start: 2018-07-06 | End: 2018-07-06

## 2018-07-06 RX ORDER — POTASSIUM PHOSPHATE, MONOBASIC POTASSIUM PHOSPHATE, DIBASIC 236; 224 MG/ML; MG/ML
30 INJECTION, SOLUTION INTRAVENOUS ONCE
Qty: 0 | Refills: 0 | Status: COMPLETED | OUTPATIENT
Start: 2018-07-06 | End: 2018-07-06

## 2018-07-06 RX ORDER — HYDROXYZINE HCL 10 MG
0 TABLET ORAL
Qty: 0 | Refills: 0 | COMMUNITY

## 2018-07-06 RX ORDER — POTASSIUM CHLORIDE 20 MEQ
10 PACKET (EA) ORAL
Qty: 0 | Refills: 0 | Status: DISCONTINUED | OUTPATIENT
Start: 2018-07-06 | End: 2018-07-06

## 2018-07-06 RX ADMIN — Medication 100 MILLIEQUIVALENT(S): at 07:15

## 2018-07-06 RX ADMIN — Medication 650 MILLIGRAM(S): at 09:33

## 2018-07-06 RX ADMIN — ELVITEGRAVIR, COBICISTAT, EMTRICITABINE, AND TENOFOVIR ALAFENAMIDE 1 TABLET(S): 150; 150; 200; 10 TABLET ORAL at 11:40

## 2018-07-06 RX ADMIN — POTASSIUM PHOSPHATE, MONOBASIC POTASSIUM PHOSPHATE, DIBASIC 85 MILLIMOLE(S): 236; 224 INJECTION, SOLUTION INTRAVENOUS at 09:34

## 2018-07-06 RX ADMIN — HEPARIN SODIUM 5000 UNIT(S): 5000 INJECTION INTRAVENOUS; SUBCUTANEOUS at 05:43

## 2018-07-06 RX ADMIN — Medication 40 MILLIEQUIVALENT(S): at 00:13

## 2018-07-06 RX ADMIN — PANTOPRAZOLE SODIUM 40 MILLIGRAM(S): 20 TABLET, DELAYED RELEASE ORAL at 07:25

## 2018-07-06 RX ADMIN — Medication 100 MILLIEQUIVALENT(S): at 05:43

## 2018-07-06 RX ADMIN — HEPARIN SODIUM 5000 UNIT(S): 5000 INJECTION INTRAVENOUS; SUBCUTANEOUS at 13:13

## 2018-07-06 NOTE — PROGRESS NOTE ADULT - PROBLEM SELECTOR PLAN 1
Pt with a PMH of HIV presenting with fever, headache for the past 4 days, also presenting with erythema/rash , as per patient erythema of face, neck torso is new, no hx of sun exposure/sunburn/. Pt also found to have b/l conjunctivitis .   C/o headache,  constant, not positional, no alleviating or relieving factors,  no hx of headaches in past , no neurological symptoms , no numbness no weakness no sensory deficits. No complaints of nausea or vomiting   Upon admission found to have elevated lactate to 2.1, wbc normal with no left shift, increased ferritin to 1530, ESR normal 3, CRP elevated to 13.14.  Pt workup done so far shows unremarkable CT head , usg abdomen unremarkable, hep panel negative. Lp and gram stain negative  CSF PCR negative, cxr negative for infiltrates   Of note patient has a hx of recent travel to [place] (1 month ago)  F/u zika virus, chikungunya antibody, RMSF, tick panel, malaria, dengue fever virus antibody   F/U cryptococcal antigen  Of note pt received one dose of cef and vanc at [facility] concern for meningitis given hx of headaches however LP , gram stain, CSF PCR unremarkable so far Pt with a PMH of HIV presenting with fever, headache and rash of neck , torso, and groin for the past 4 days, also presenting with erythema/rash , as per patient erythema of face, neck torso is new, no hx of sun exposure/sunburn/. Pt also found to have b/l conjunctivitis .   C/o headache,  constant, not positional, no alleviating or relieving factors,  no hx of headaches in past , no neurological symptoms , no numbness no weakness no sensory deficits. No complaints of nausea or vomiting   Upon admission found to have elevated lactate to 2.1, wbc normal with no left shift, increased ferritin to 1530, ESR normal 3, CRP elevated to 13.14.  Pt workup done so far shows unremarkable CT head , usg abdomen unremarkable, hep panel negative. Lp and gram stain negative  CSF PCR negative, cxr negative for infiltrates   Of note patient has a hx of recent travel to [place] (1 month ago)  F/u zika virus, chikungunya antibody, RMSF, tick panel, malaria, dengue fever virus antibody   F/U cryptococcal antigen  -add on VDRL to CSF, test for chlamydia and gonorrhea urine, oral swabs.   syphilis screen   Of note pt received one dose of cef and vanc at [facility] concern for meningitis given hx of headaches however LP , gram stain, CSF PCR unremarkable so far  Workup for autoimmune causes in setting of elevated crp and ferritin. Given previous hx of ITP at risk   f/u DELANO, dsDNA, CCP  Consult ID and appreciate recs

## 2018-07-06 NOTE — PROGRESS NOTE ADULT - PROBLEM SELECTOR PLAN 3
Patient reports HIV well controlled  - Collateral in AM  - HIV VL  - No need for CD4 count as well be decreased due to acute illness, collateral is more important. No History of OI. Patient does not know how contracted, reports safe sexual practices and no known contacts, no IVDU. 15 years ago, untreated for about 8 years, then started on medication. Does not remember tanika CD4 or peak VL, does not think he was ever given diagnosis of AIDs. Resolving  Cr today 1.12<1.7  s/p 3 L fluids and maintenance fluids   LYTES consistent with pre- renal disease  will continue to monitor for now

## 2018-07-06 NOTE — PROGRESS NOTE ADULT - PROBLEM SELECTOR PLAN 4
Patient with ROBBIN without elevated BUN, dark urine despite patient reporting high level of hydration during febrile illness. Unclear etiology as patient with dark urine but with elevated lft may have had hypotensive injury, so both pre-renal and intrinsic damage is possible  - follow up repeat CMP  - Follow up urine lytes  - follow up renal ulrasound  - Patient voiding well, no concern for obstruction on physical exam or RUQ U/s however will check post void bladder scan  - Consider nephrology consult in AM RUQ ultrasound normal, likely due to injury from fever   - Acute hepatitis panel negative  LFTS downtrending will continue to monitor  AST 59<111  ALT 72<126

## 2018-07-06 NOTE — PROGRESS NOTE ADULT - PROBLEM SELECTOR PLAN 8
Regular diet  Replete lytes judiciously given ROBBIN that may progress to renal failure Heparin SQ 5000 Q8hrs  Pantoprazole as above    FULL CODE    Dispo: Admit to F

## 2018-07-06 NOTE — PROGRESS NOTE ADULT - SUBJECTIVE AND OBJECTIVE BOX
OVERNIGHT EVENTS: admitted overnight see h&p     SUBJECTIVE / INTERVAL HPI: Patient seen and examined at bedside.     VITAL SIGNS:  Vital Signs Last 24 Hrs  T(C): 38.3 (06 Jul 2018 08:42), Max: 39.4 (06 Jul 2018 07:58)  T(F): 100.9 (06 Jul 2018 08:42), Max: 103 (06 Jul 2018 07:58)  HR: 100 (06 Jul 2018 08:42) (90 - 110)  BP: 119/71 (06 Jul 2018 08:42) (94/64 - 143/80)  BP(mean): 86 (05 Jul 2018 20:58) (86 - 86)  RR: 18 (06 Jul 2018 08:42) (16 - 19)  SpO2: 93% (06 Jul 2018 08:42) (92% - 100%)    PHYSICAL EXAM:    General: WDWN  HEENT: conjunctivitis noted b/l eyes  Neck: supple  Cardiovascular: +S1/S2; RRR  Respiratory: CTA B/L; no W/R/R  Gastrointestinal: soft, NT/ND; +BSx4  Extremities: WWP; no edema, clubbing or cyanosis  Vascular: 2+ radial, DP/PT pulses B/L  Neurological: AAOx3; no focal deficits  skin: erythema of head neck and torso, as per patient this is new, he is usually very pale.   MEDICATIONS:  MEDICATIONS  (STANDING):  heparin  Injectable 5000 Unit(s) SubCutaneous every 8 hours  pantoprazole    Tablet 40 milliGRAM(s) Oral before breakfast  sodium chloride 0.9%. 1000 milliLiter(s) (150 mL/Hr) IV Continuous <Continuous>  tenofovir alafenamide 10 mG/fyskafifeues960 mG/cobicistat 150 mG/emtricitabine 200 mG (GENVOYA) 1 Tablet(s) Oral daily    MEDICATIONS  (PRN):      ALLERGIES:  Allergies    No Known Allergies    Intolerances        LABS:                        13.5   6.1   )-----------( 204      ( 06 Jul 2018 06:25 )             40.2     07-06    134<L>  |  97  |  12  ----------------------------<  114<H>  3.7   |  22  |  1.12    Ca    7.7<L>      06 Jul 2018 06:25  Phos  1.4     07-06  Mg     1.9     07-06    TPro  6.3  /  Alb  3.2<L>  /  TBili  0.9  /  DBili  x   /  AST  59<H>  /  ALT  72<H>  /  AlkPhos  106  07-06    PT/INR - ( 05 Jul 2018 22:49 )   PT: 12.0 sec;   INR: 1.08          PTT - ( 05 Jul 2018 22:49 )  PTT:31.1 sec  Urinalysis Basic - ( 06 Jul 2018 10:35 )    Color: Yellow / Appearance: Clear / SG: >=1.030 / pH: x  Gluc: x / Ketone: 40 mg/dL  / Bili: Small / Urobili: 2.0 E.U./dL   Blood: x / Protein: 100 mg/dL / Nitrite: NEGATIVE   Leuk Esterase: NEGATIVE / RBC: < 5 /HPF / WBC < 5 /HPF   Sq Epi: x / Non Sq Epi: 0-5 /HPF / Bacteria: Present /HPF      CAPILLARY BLOOD GLUCOSE      POCT Blood Glucose.: 122 mg/dL (05 Jul 2018 12:11)      RADIOLOGY & ADDITIONAL TESTS: Reviewed.    ASSESSMENT:    PLAN: OVERNIGHT EVENTS: admitted overnight see h&p     SUBJECTIVE / INTERVAL HPI: Patient seen and examined at bedside.     VITAL SIGNS:  Vital Signs Last 24 Hrs  T(C): 38.3 (06 Jul 2018 08:42), Max: 39.4 (06 Jul 2018 07:58)  T(F): 100.9 (06 Jul 2018 08:42), Max: 103 (06 Jul 2018 07:58)  HR: 100 (06 Jul 2018 08:42) (90 - 110)  BP: 119/71 (06 Jul 2018 08:42) (94/64 - 143/80)  BP(mean): 86 (05 Jul 2018 20:58) (86 - 86)  RR: 18 (06 Jul 2018 08:42) (16 - 19)  SpO2: 93% (06 Jul 2018 08:42) (92% - 100%)    PHYSICAL EXAM:    General: WDWN  HEENT: conjunctivitis noted b/l eyes  Neck: supple  Cardiovascular: +S1/S2; RRR  Respiratory: CTA B/L; no W/R/R  Gastrointestinal: soft, NT/ND; +BSx4  Extremities: WWP; no edema, clubbing or cyanosis  Vascular: 2+ radial, DP/PT pulses B/L  Neurological: AAOx3; no focal deficits  skin: erythema of head neck and torso all the way to 2 inches beneath groin, blanchable .As per pt he is usually very pale, this is new for him, no hx of exposure to sun   MEDICATIONS:  MEDICATIONS  (STANDING):  heparin  Injectable 5000 Unit(s) SubCutaneous every 8 hours  pantoprazole    Tablet 40 milliGRAM(s) Oral before breakfast  sodium chloride 0.9%. 1000 milliLiter(s) (150 mL/Hr) IV Continuous <Continuous>  tenofovir alafenamide 10 mG/dkflipazgvcw269 mG/cobicistat 150 mG/emtricitabine 200 mG (GENVOYA) 1 Tablet(s) Oral daily    MEDICATIONS  (PRN):      ALLERGIES:  Allergies    No Known Allergies    Intolerances        LABS:                        13.5   6.1   )-----------( 204      ( 06 Jul 2018 06:25 )             40.2     07-06    134<L>  |  97  |  12  ----------------------------<  114<H>  3.7   |  22  |  1.12    Ca    7.7<L>      06 Jul 2018 06:25  Phos  1.4     07-06  Mg     1.9     07-06    TPro  6.3  /  Alb  3.2<L>  /  TBili  0.9  /  DBili  x   /  AST  59<H>  /  ALT  72<H>  /  AlkPhos  106  07-06    PT/INR - ( 05 Jul 2018 22:49 )   PT: 12.0 sec;   INR: 1.08          PTT - ( 05 Jul 2018 22:49 )  PTT:31.1 sec  Urinalysis Basic - ( 06 Jul 2018 10:35 )    Color: Yellow / Appearance: Clear / SG: >=1.030 / pH: x  Gluc: x / Ketone: 40 mg/dL  / Bili: Small / Urobili: 2.0 E.U./dL   Blood: x / Protein: 100 mg/dL / Nitrite: NEGATIVE   Leuk Esterase: NEGATIVE / RBC: < 5 /HPF / WBC < 5 /HPF   Sq Epi: x / Non Sq Epi: 0-5 /HPF / Bacteria: Present /HPF      CAPILLARY BLOOD GLUCOSE      POCT Blood Glucose.: 122 mg/dL (05 Jul 2018 12:11)      RADIOLOGY & ADDITIONAL TESTS: Reviewed.    ASSESSMENT:    PLAN:

## 2018-07-06 NOTE — DISCHARGE NOTE ADULT - HOSPITAL COURSE
PATIENT LEFT AMA   Patient is a 49 yo M with PMHx of HIV (VLUD, CD4 unknown level but "Good", on Genvoya), ITP 15 years ago s/p splenectomy. Sent by PMD to urgent care then to ED for 2 days of fever, chills, and HA and decreased appetite. Patient was in usual state of health 3 days ago, was out working a job (, works outdoors) in the heat, went home and then started to have fevers overnight into the next day, with chills, diaphoresis, HA, and lack of appetite. Patient states he drank a lot of fluid during this time but had no appetite for food, denies N/V/D/C. HA worsened and did not remit with OTC Nsaids or tylenol, and so presented to PMD who sent him to urgent care who then sent him to [facility]. Patient last traveled 1 month ago to [place].   ED Vitals: T 101.8, HR 96, /70, RR 16, SpO2 92%, Pt got single dose of  Ceftriaxone 1gm, Vancomycin 1500mg,  cc x6, tylenol 975  Upon examination patient was seen to have an erythematous rash over head neck torso and groin, pt also found to have b/l conjunctivitis.   Labs showing  elevated lactate to 2.1, wbc normal with no left shift, increased ferritin to 1530, ESR normal 3, CRP elevated to 13.14. Pt workup showing unremarkable CT head , usg abdomen unremarkable, hep panel negative. Lp and gram stain negative. CSF PCR negative, cxr negative for infiltrates   Pt was seen by ID during course of admissions and worked up for fever of unknown origin. Pending zika virus, chikungunya antibody, RMSF, tick panel, malaria, dengue fever virus antibody , cryptococcal antigen, pending  VDRL to CSF, test for chlamydia and gonorrhea urine, oral swabs. Pending  syphilis screen , pending MRI BRAIN non contrast   Pt also worked up for autoimmune causes in setting of elevated crp and ferritin. Pending  DELANO, dsDNA, CCP    PT HAS DECIDED TO LEAVE [place] DESPITE REPEATED EFFORTS AND INFORMING PATIENT OF RISKS INVOLVED IN LEAVING MEDICAL TREATMENT.

## 2018-07-06 NOTE — PROGRESS NOTE ADULT - PROBLEM SELECTOR PLAN 6
Likely due to dehydration given histry of fever and physical exam findings.   - Follow up serum osm  - Follow up repeat CMP  - Follow up urine lytes Patient reports well controlled with omeprazole  - pantoprazole as therapeutic conversion

## 2018-07-06 NOTE — PROGRESS NOTE ADULT - ASSESSMENT
Patient is a 47 yo M with PMHx of HIV (VLUD, CD4 unknown level but "Good", on Genvoya), ITP 15 years ago s/p splenectomy presents with two days of fevers, chills, HA and diaphoresis, admitted for fever of unknown origin.

## 2018-07-06 NOTE — CHART NOTE - NSCHARTNOTEFT_GEN_A_CORE
Alerted by nurse that patient wished to leave against medical advice. I went to see patient at bedside and patient reported that he is tired of all the blood draws and IV lines in his arm. Patient wished to go home and take a cold shower since he feels "Disgusting". Patient also wished to see his dog. I offered to remove 1 of the 2 IV lines in his arm or to give pain medication for his pain however patient refused. Patient was explained the risk associated with him leaving the hospital with a possible infection brewing which includes worsening fevers, rash, headaches and worsening decompensation which may lead to loss of consciousness and even death  and patient verbalized understanding and has capacity. These risks were also explained by the infectious disease attending as well. Patient understands if he his condition worsens to seek care at a hospital immediately.        Labs performed but pending  DELANO  Chinkungunya ab  Cryptococcal antigen  Blood cultures  Urine culture  CCP and RF  CMV igm serum  Dengue virus serology  DS-DNA  HIV RNA quantative  RMSF serology  Syphillis screen   Tick born dz panel   Zika virus serum

## 2018-07-06 NOTE — DISCHARGE NOTE ADULT - PATIENT PORTAL LINK FT
You can access the [facility] Patient Portal, offered by , by registering with the following website: [email or web address]

## 2018-07-06 NOTE — PROGRESS NOTE ADULT - PROBLEM SELECTOR PLAN 5
RUQ ultrasound normal, likely due to injury from fever as above  - Acute hepatitis panel  - Repeat LFTs, Coags Resolving NA today Likely due to dehydration given history of fever and physical exam findings.

## 2018-07-06 NOTE — DISCHARGE NOTE ADULT - CARE PLAN
Principal Discharge DX:	Fever of unknown origin  Goal:	To achieve resolution of your symptoms  Assessment and plan of treatment:	During the course of your admission you were found to have a fever as well as a rash that extended onto your torso and groin. We admitted you for workup of fever of unknown origin and workup was initiated . You have decided to leave AMA , despite being informed of the risks of not pursuing further medical care. Please follow up with your PMD outpatient for further workup  Secondary Diagnosis:	HIV (human immunodeficiency virus infection)  Assessment and plan of treatment:	You have a PMH of HIV. Please follow up with your  Secondary Diagnosis:	ROBBIN (acute kidney injury)  Secondary Diagnosis:	Elevated LFTs Principal Discharge DX:	Fever of unknown origin  Goal:	To achieve resolution of your symptoms  Assessment and plan of treatment:	During the course of your admission you were found to have a fever as well as a rash that extended onto your torso and groin. We admitted you for workup of fever of unknown origin and workup was initiated . You have decided to leave AMA , despite being informed of the risks of not pursuing further medical care. Please follow up with your PMD outpatient for further workup  Secondary Diagnosis:	HIV (human immunodeficiency virus infection)  Assessment and plan of treatment:	You have a PMH of HIV. Please follow up with your PMD .  Secondary Diagnosis:	ROBBIN (acute kidney injury)  Assessment and plan of treatment:	You were found to have an acute kidney injury upon arrival which improved following administration of fluids  Secondary Diagnosis:	Elevated LFTs  Assessment and plan of treatment:	You were found to have elevated LFTS upon admission which downtrended following administration of fluids. Please follow up with your PMD outpatient

## 2018-07-06 NOTE — PROGRESS NOTE ADULT - PROBLEM SELECTOR PLAN 9
Heparin SQ 5000 Q8hrs  Pantoprazole as above    FULL CODE    Dispo: Admit to F PMD to be contacted in AM as above

## 2018-07-06 NOTE — PROGRESS NOTE ADULT - PROBLEM SELECTOR PLAN 2
Patient with high grade fever, 103F, without clear source possibly causing end organ damage as detailed below.  - Judicious use of acetaminophen and nsaids for fever control given end organ damage as below  - Continue workup for source control  - f/u ferritin for stills  - f/u ESR, CRP  - Consider broad spectrum antibiotics when source has been identified Pt has a PMH of HIV diagnosed 15 years ago, on treatment for past 8 years , MSM, uses protection most of the time however last incidence of unprotected intercourse about 1 month ago  Patient reports HIV well controlled  unclear viral load and cd4 count however patient reports viral load undetectable with good cd4 counts  will f/u  HIV VL  - No need for CD4 count as well be decreased due to acute illness, collateral is more important.

## 2018-07-06 NOTE — DISCHARGE NOTE ADULT - MEDICATION SUMMARY - MEDICATIONS TO TAKE
I will START or STAY ON the medications listed below when I get home from the hospital:    Genvoya oral tablet  -- 1 tab(s) by mouth once a day  -- Indication: For HIV (human immunodeficiency virus infection)    PriLOSEC 40 mg oral delayed release capsule  -- 1 cap(s) by mouth once a day  -- Indication: For GERD (gastroesophageal reflux disease)

## 2018-07-06 NOTE — PROGRESS NOTE ADULT - PROBLEM SELECTOR PLAN 7
Patient reports well controlled with omeprazole  - pantoprazole as therapeutic conversion Regular diet

## 2018-07-06 NOTE — CONSULT NOTE ADULT - SUBJECTIVE AND OBJECTIVE BOX
is a  who travels a lot, including recent trip to [place].  he is MSM with multiple sexual partner. He is insertive anal and oral sex. Claims no receptive anal sex ever.    fever for 4 days, loss of apetite. and headache with no visual symptoms    No other symptoms.  PE chest wall eruption no other rash or mucosal lesions.    No adenopathy.    On Genvoya and fully compliant.    I reviewed all the laBS, IMAGING, lP Results  I spoke with the team and attending.    Neurosyphilis ( acute) likely- no CSF cells, mildly elevated protein  Add CSF VDRL    Acute sexualy transmitted Hep C-B? Check hep B antibody to see if he is already immune  will Check Hep C viral load. Antibody is negative    3 site testing for GC and chlamydia ordered and done.    Will JUSTIN

## 2018-07-06 NOTE — DISCHARGE NOTE ADULT - PLAN OF CARE
To achieve resolution of your symptoms During the course of your admission you were found to have a fever as well as a rash that extended onto your torso and groin. We admitted you for workup of fever of unknown origin and workup was initiated . You have decided to leave AMA , despite being informed of the risks of not pursuing further medical care. Please follow up with your PMD outpatient for further workup You have a PMH of HIV. Please follow up with your You have a PMH of HIV. Please follow up with your PMD . You were found to have an acute kidney injury upon arrival which improved following administration of fluids You were found to have elevated LFTS upon admission which downtrended following administration of fluids. Please follow up with your PMD outpatient

## 2018-07-07 LAB
CMV IGM FLD-ACNC: <8 AU/ML — SIGNIFICANT CHANGE UP
CMV IGM SERPL QL: NEGATIVE — SIGNIFICANT CHANGE UP
DSDNA AB SER-ACNC: <12 IU/ML — SIGNIFICANT CHANGE UP
RHEUMATOID FACT SERPL-ACNC: 12 IU/ML — SIGNIFICANT CHANGE UP (ref 0–13)
T PALLIDUM AB TITR SER: NEGATIVE — SIGNIFICANT CHANGE UP

## 2018-07-08 LAB
CULTURE RESULTS: SIGNIFICANT CHANGE UP
SPECIMEN SOURCE: SIGNIFICANT CHANGE UP

## 2018-07-09 LAB
A PHAGOCYTOPH IGG TITR SER IF: SIGNIFICANT CHANGE UP TITER
B BURGDOR AB SER QL IA: NEGATIVE — SIGNIFICANT CHANGE UP
B MICROTI IGG TITR SER: SIGNIFICANT CHANGE UP TITER
CCP IGG SERPL-ACNC: <8 UNITS — SIGNIFICANT CHANGE UP (ref 0–19)
CMV DNA CSF QL NAA+PROBE: SIGNIFICANT CHANGE UP
CMV DNA SPEC NAA+PROBE-LOG#: SIGNIFICANT CHANGE UP LOGIU/ML
E CHAFFEENSIS IGG TITR SER IF: SIGNIFICANT CHANGE UP TITER
RF+CCP IGG SER-IMP: NEGATIVE — SIGNIFICANT CHANGE UP
ZIKV IGM SERPL QL IA: NEGATIVE — SIGNIFICANT CHANGE UP

## 2018-07-10 LAB
ANA PAT FLD IF-IMP: ABNORMAL
ANA TITR SER: ABNORMAL
CULTURE RESULTS: SIGNIFICANT CHANGE UP
R RICKETTSI AB SER-ACNC: NEGATIVE — SIGNIFICANT CHANGE UP
R RICKETTSI IGM SER-ACNC: 0.18 INDEX — SIGNIFICANT CHANGE UP (ref 0–0.89)
RICK SF IGG TITR SER IF: NEGATIVE — SIGNIFICANT CHANGE UP
RICK SF IGM TITR SER IF: 0.18 INDEX — SIGNIFICANT CHANGE UP (ref 0–0.89)
SPECIMEN SOURCE: SIGNIFICANT CHANGE UP

## 2018-07-11 DIAGNOSIS — E87.1 HYPO-OSMOLALITY AND HYPONATREMIA: ICD-10-CM

## 2018-07-11 DIAGNOSIS — N17.9 ACUTE KIDNEY FAILURE, UNSPECIFIED: ICD-10-CM

## 2018-07-11 DIAGNOSIS — E86.0 DEHYDRATION: ICD-10-CM

## 2018-07-11 DIAGNOSIS — R21 RASH AND OTHER NONSPECIFIC SKIN ERUPTION: ICD-10-CM

## 2018-07-11 DIAGNOSIS — R94.5 ABNORMAL RESULTS OF LIVER FUNCTION STUDIES: ICD-10-CM

## 2018-07-11 DIAGNOSIS — R50.9 FEVER, UNSPECIFIED: ICD-10-CM

## 2018-07-11 DIAGNOSIS — Z90.81 ACQUIRED ABSENCE OF SPLEEN: ICD-10-CM

## 2018-07-11 DIAGNOSIS — H10.9 UNSPECIFIED CONJUNCTIVITIS: ICD-10-CM

## 2018-07-11 DIAGNOSIS — Z21 ASYMPTOMATIC HUMAN IMMUNODEFICIENCY VIRUS [HIV] INFECTION STATUS: ICD-10-CM

## 2018-07-11 DIAGNOSIS — K21.9 GASTRO-ESOPHAGEAL REFLUX DISEASE WITHOUT ESOPHAGITIS: ICD-10-CM

## 2018-07-11 LAB
CHIKUNGUNYA AB IGG IGM W/REFLEX RESULT: SIGNIFICANT CHANGE UP
CHIKV AB TITR SER: SIGNIFICANT CHANGE UP
CULTURE RESULTS: SIGNIFICANT CHANGE UP
CULTURE RESULTS: SIGNIFICANT CHANGE UP
SPECIMEN SOURCE: SIGNIFICANT CHANGE UP
SPECIMEN SOURCE: SIGNIFICANT CHANGE UP
ZIKA VIRUS PCR SERUM: SIGNIFICANT CHANGE UP
ZIKV RNA SERPL QL NAA+PROBE: SIGNIFICANT CHANGE UP

## 2018-07-12 LAB
DENV IGG+IGM PNL SER IA: 1 ISR — SIGNIFICANT CHANGE UP
DENV IGM SER-ACNC: 1.29 ISR — SIGNIFICANT CHANGE UP

## 2021-07-16 NOTE — ED PROVIDER NOTE - RELIEVING FACTORS
Department of Anesthesiology  Postprocedure Note    Patient: Jorge Strickland  MRN: [id number]  YOB: 1959  Date of evaluation: 7/16/2021  Time:  5:17 PM     Procedure Summary     Date: 07/16/21 Room / Location: [facility] - INPATIENT    Anesthesia Start: 1400 Anesthesia Stop: 1912    Procedure: CYSTO  FULGURATION  EXCISION PENILE WARTS (N/A ) Diagnosis: (DX PENILE WARTS)    Surgeons: Néstor Servin MD Responsible Provider: Ally Condon MD    Anesthesia Type: general ASA Status: 2          Anesthesia Type: general    Leonarda Phase I: Leonarda Score: 10    Leonarda Phase II: Leonarda Score: 10    Last vitals: Reviewed and per EMR flowsheets.        Anesthesia Post Evaluation    Patient location during evaluation: PACU  Patient participation: complete - patient participated  Level of consciousness: awake and alert  Airway patency: patent  Nausea & Vomiting: no nausea and no vomiting  Complications: no  Cardiovascular status: hemodynamically stable  Respiratory status: acceptable  Hydration status: stable none

## 2025-01-24 NOTE — DISCHARGE NOTE ADULT - REASON FOR ADMISSION
SIRS, r/out meningitis For information on Fall & Injury Prevention, visit: [email or web address] OR  [email or web address] OR  https://www.cdc.gov/steadi/patient.html fever of unknown origin